# Patient Record
Sex: MALE | URBAN - METROPOLITAN AREA
[De-identification: names, ages, dates, MRNs, and addresses within clinical notes are randomized per-mention and may not be internally consistent; named-entity substitution may affect disease eponyms.]

---

## 2019-01-15 ENCOUNTER — APPOINTMENT (RX ONLY)
Dept: URBAN - METROPOLITAN AREA CLINIC 36 | Facility: CLINIC | Age: 39
Setting detail: DERMATOLOGY
End: 2019-01-15

## 2019-01-15 DIAGNOSIS — L56.2 PHOTOCONTACT DERMATITIS [BERLOQUE DERMATITIS]: ICD-10-CM

## 2019-01-15 DIAGNOSIS — L70.2 ACNE VARIOLIFORMIS: ICD-10-CM

## 2019-01-15 PROBLEM — L29.8 OTHER PRURITUS: Status: ACTIVE | Noted: 2019-01-15

## 2019-01-15 PROBLEM — F32.9 MAJOR DEPRESSIVE DISORDER, SINGLE EPISODE, UNSPECIFIED: Status: ACTIVE | Noted: 2019-01-15

## 2019-01-15 PROBLEM — L70.0 ACNE VULGARIS: Status: ACTIVE | Noted: 2019-01-15

## 2019-01-15 PROCEDURE — 99202 OFFICE O/P NEW SF 15 MIN: CPT

## 2019-01-15 RX ORDER — CLINDAMYCIN PHOSPHATE 10 MG/G
AEROSOL, FOAM TOPICAL
Qty: 1 | Refills: 1 | Status: ERX | COMMUNITY
Start: 2019-01-15

## 2019-01-15 RX ADMIN — CLINDAMYCIN PHOSPHATE: 10 AEROSOL, FOAM TOPICAL at 20:13

## 2019-01-15 ASSESSMENT — SEVERITY ASSESSMENT OVERALL AMONG ALL PATIENTS
IN YOUR EXPERIENCE, AMONG ALL PATIENTS YOU HAVE SEEN WITH THIS CONDITION, HOW SEVERE IS THIS PATIENT'S CONDITION?: ALMOST CLEAR

## 2019-01-15 ASSESSMENT — SEVERITY ASSESSMENT: SEVERITY: MILD TO MODERATE

## 2019-01-15 NOTE — HPI: FREE FORM (INITIAL HISTORY)
How Severe Is Your Skin Condition? (The Patient Describes The Severity Level As....): moderate
What Brings You In Today? (This Is An Xx Year Old Patient Who Presents For...): Shlomo
When Did You First Notice It? (The Patient First Noticed It...): Years
Where On Your Body Is It? (Located On...): Scalp
What Happened Since That Time? (Since That Time...): Not getting better, never really went away.
What Previous Treatments Have You Tried? (The Patient Has Tried The Following Treatments...): Prescriptions topicals
Did Anything Happen To Make You Want To Come In For This Specifically Today? (The Specific Reason That The Patient Came In Today Was Because:): Evaluation and treatment